# Patient Record
Sex: FEMALE | Race: WHITE
[De-identification: names, ages, dates, MRNs, and addresses within clinical notes are randomized per-mention and may not be internally consistent; named-entity substitution may affect disease eponyms.]

---

## 2019-09-26 ENCOUNTER — HOSPITAL ENCOUNTER (OUTPATIENT)
Dept: HOSPITAL 96 - M.PC | Age: 74
End: 2019-09-26
Attending: ANESTHESIOLOGY
Payer: COMMERCIAL

## 2019-09-26 DIAGNOSIS — I10: ICD-10-CM

## 2019-09-26 DIAGNOSIS — D64.9: ICD-10-CM

## 2019-09-26 DIAGNOSIS — Z79.899: ICD-10-CM

## 2019-09-26 DIAGNOSIS — F32.9: ICD-10-CM

## 2019-09-26 DIAGNOSIS — Z85.048: ICD-10-CM

## 2019-09-26 DIAGNOSIS — I73.9: ICD-10-CM

## 2019-09-26 DIAGNOSIS — J43.9: ICD-10-CM

## 2019-09-26 DIAGNOSIS — E55.9: ICD-10-CM

## 2019-09-26 DIAGNOSIS — M54.16: ICD-10-CM

## 2019-09-26 DIAGNOSIS — M48.56XD: Primary | ICD-10-CM

## 2019-10-03 ENCOUNTER — HOSPITAL ENCOUNTER (OUTPATIENT)
Dept: HOSPITAL 96 - M.PC | Age: 74
Discharge: HOME | End: 2019-10-03
Attending: ANESTHESIOLOGY
Payer: COMMERCIAL

## 2019-10-03 DIAGNOSIS — I73.89: ICD-10-CM

## 2019-10-03 DIAGNOSIS — J43.8: ICD-10-CM

## 2019-10-03 DIAGNOSIS — E55.9: ICD-10-CM

## 2019-10-03 DIAGNOSIS — Z79.899: ICD-10-CM

## 2019-10-03 DIAGNOSIS — I10: ICD-10-CM

## 2019-10-03 DIAGNOSIS — D64.9: ICD-10-CM

## 2019-10-03 DIAGNOSIS — F32.9: ICD-10-CM

## 2019-10-03 DIAGNOSIS — M54.16: Primary | ICD-10-CM

## 2019-10-03 DIAGNOSIS — Z85.048: ICD-10-CM

## 2019-10-23 NOTE — PAINCON
[address]  [address]                    PAIN MANAGEMENT CONSULTATION  
_______________________________________________________________________________
 
Name:       CORBIN MCKEON                 Room:                      REG CLI 
M.R.#:  [id number]      Account #:      [id number]  
Admission:  10/03/19     Attend Phys:    MEGHAN Paulino MD   
Discharge:               Date of Birth:  [birth year]  
         Report #: [id number]
                                                                     [id number]  
_______________________________________________________________________________
THIS REPORT FOR:  //name//                      
 
CC: Cody Paulino
 
DATE OF SERVICE:  10/03/2019
 
 
PRIMARY CARE PHYSICIAN:  Cody Wills MD
 
CHIEF COMPLAINT:  Pain down into the left leg as well as some left shoulder
pain.
 
HISTORY:  The patient is a 73-year-old female who has been seen in the pain
clinic because of lumbar radiculopathy.  She is experiencing pain, which has
been radiating down into her left leg.  Pain also is problematic in the left
scapular area and left shoulder.  She has had lumbar pain for years.  She states
that she recently fell and hurt her rib.  Has not sought medical attention. 
Notes that her pain increases with standing.  She has used CBD oil.  Feels that
provides some benefit.  In the past, she has had epidural steroid injections and
these provided pain relief for a similar pain.  She has returned today with
hopes of undergoing an epidural steroid injection.  As you may recall, she has
foot drop.  She has had this since she was a child.  Does walk with use of a
rolling walker.  She has been told that she has an aortic aneurysm in the
abdominal area, but is not large enough to treat.
 
ALLERGIES:  No known drug allergies.
 
CURRENT MEDICATIONS:  Amlodipine 10 mg, vitamin D3 5000 units weekly, vitamin D3
2000 units daily, Trelegy Ellipta 100/62.5/25, meclizine 25 mg t.i.d.
 
PAIN CLINIC ASSESSMENT AND PQRS:
1.  The patient is not being treated for osteoarthritis or rheumatoid arthritis.
2.  Height 5 feet 2 inches, weight 127 pounds, BMI is 22.
3.  Vitamins.
4.  Blood pressure 130/73, heart rate 80, respiratory rate 18, room air
saturation 95%, temperature 97.9.
5.  Pain intensity score 15/10.
6.  Fall history:  The patient has not fallen since we saw her last.
7.  Blood thinner.  The patient is not on a blood thinning medication.
8.  Hypertension.  The patient is being treated for hypertension.
9.  Opioids greater than 6 weeks.  The patient receives her pain medications
from her primary.  She is not on an opioid regimen.
10.  Risk assessment tool, low for opioid use.
11.  Functional assessment tool.
12.  Recreational drug use.  The patient denies.
 
 
 
[place] [address]                    PAIN MANAGEMENT CONSULTATION  
_______________________________________________________________________________
 
Name:       ALEXKASSIEFARIDACORBIN                 Room:                      REG CLI 
M.R.#:  [id number]      Account #:      [id number]  
Admission:  10/03/19     Attend Phys:    MEGHAN Paulino MD   
Discharge:               Date of Birth:  [birth year]  
         Report #: [id number]
                                                                     [id number]  
_______________________________________________________________________________
13.  Tobacco:  The patient denies.
14.  Alcohol.  The patient denies frequent use of alcoholic beverages.
 
PHYSICAL EXAMINATION:
GENERAL:  The patient is a well-developed, well-nourished white female.  Appears
her stated age.  She is alert and oriented x 3.  Her affect is appropriate. 
Speech is fluent.
HEENT:  Normocephalic, atraumatic.  Extraocular eye muscles are intact.  Sclerae
nonicteric.  Mucous membranes are moist.
NECK:  Without adenopathy.
LUNGS:  Generally clear to auscultation.
HEART:  Regular rate.
ABDOMEN:  Nontender.
MUSCULOSKELETAL:  Upper extremity muscle strength judged to be 5-/5 for the
major muscle groups in the upper extremity.  The patient has some pain and
discomfort in the left shoulder area near the scapula.  Has some discomfort near
the rhomboids.  Lower extremity, the patient has pain and discomfort that is
radiating down in the L5-S1 dermatomal distribution on the left side.  Has a
perception of weakness in her left leg.  The patient has foot drop on the left
side.  The patient has a well-healed scar on the left side of the leg.  The
patient is without significant scoliosis, kyphosis or lordosis.  The patient is
ambulating with a rolling walker.
 
IMPRESSION:
1.  Lumbar radiculopathy with L5-S1 dermatomal distribution.
2.  Aortic aneurysm, stable.
3.  Vitamin D deficiency.
4.  Compression fracture at L2 in the past.
5.  Anemia.
6.  Peripheral vascular disease, left foot drop.
7.  Elevated liver enzymes.
8.  Depression.
9.  Hypertension.
10.  Emphysema.
11.  History of rectal cancer, status post chemotherapy and radiation in 2008.
 
RECOMMENDATIONS:  We discussed treatment options with the patient.  At this
juncture, she is having pain that is radiating down in the left L5-S1 dermatomal
distribution.  She has returned today with the hopes of undergoing an epidural
injection.  Risks and benefits of the procedure were again reviewed.  They
include but are not limited to infection, worsening pain, no improvement in
pain, nerve damage, spinal headache and the patient elects to proceed.
 
PROCEDURE NOTE:  The patient was placed in the prone position.  Fluoroscopy was
used to identify the L5-S1 area.  This area had been sterilely prepped with a
Betadine solution on 3 occasions allowed to dry.  Fluoroscopy using anterior,
 
 
 
[place] [address]                    PAIN MANAGEMENT CONSULTATION  
_______________________________________________________________________________
 
Name:       CORBIN MCKEON                 Room:                      REG CLI 
M.R.#:  [id number]      Account #:      [id number]  
Admission:  10/03/19     Attend Phys:    MEGHAN Paulino MD   
Discharge:               Date of Birth:  [birth year]  
         Report #: [id number]
                                                                     [id number]  
_______________________________________________________________________________
posterior as well as lateral viewing were implemented.  A 25-gauge needle was
then advanced into the L5-S1 area.  An infiltration of the area with 0.25%
bupivacaine was performed.  A 25-gauge needle using loss of resistance
technique, which used to gain access to the epidural space.  Aspiration was
negative.  There was no CSF, heme or paresthesia.  A total of 80 mg Depo-Medrol,
40 mg triamcinolone and 2 mL of 0.25% bupivacaine was injected.  The patient
tolerated the procedure well.  There were no complications.
 
We would like to thank you for letting us participate in her care.  We hope she
continues to improve.
 
 
 
 
 
 
 
 
 
 
 
 
 
 
 
 
 
 
 
 
 
 
 
 
 
 
 
 
 
 
 
 
 
 
<ELECTRONICALLY SIGNED>
                                        By:  MEGHAN Paulino MD            
10/23/19     0909
D: 10/03/19 1339_______________________________________
T: 10/03/19 1514N. Morteza Paulino MD               /[facility]

## 2019-11-14 ENCOUNTER — HOSPITAL ENCOUNTER (OUTPATIENT)
Dept: HOSPITAL 96 - M.PC | Age: 74
Discharge: HOME | End: 2019-11-14
Attending: ANESTHESIOLOGY
Payer: COMMERCIAL

## 2019-11-14 DIAGNOSIS — F32.9: ICD-10-CM

## 2019-11-14 DIAGNOSIS — D64.9: ICD-10-CM

## 2019-11-14 DIAGNOSIS — Z79.899: ICD-10-CM

## 2019-11-14 DIAGNOSIS — Z85.048: ICD-10-CM

## 2019-11-14 DIAGNOSIS — I73.9: ICD-10-CM

## 2019-11-14 DIAGNOSIS — I10: ICD-10-CM

## 2019-11-14 DIAGNOSIS — G89.29: ICD-10-CM

## 2019-11-14 DIAGNOSIS — Z98.890: ICD-10-CM

## 2019-11-14 DIAGNOSIS — J43.9: ICD-10-CM

## 2019-11-14 DIAGNOSIS — M54.16: Primary | ICD-10-CM

## 2019-12-02 NOTE — PAINCON
[address]  [address]                    PAIN MANAGEMENT CONSULTATION  
_______________________________________________________________________________
 
Name:       CORBIN MCKEON                 Room:                      REG CLI 
MSUNDAY#:  [id number]      Account #:      [id number]  
Admission:  11/14/19     Attend Phys:    MEGHAN Paulino MD   
Discharge:               Date of Birth:  [birth year]  
         Report #: [id number]
                                                                     [id number]  
_______________________________________________________________________________
THIS REPORT FOR:  //name//                      
 
CC: Cody Paulino
 
DATE OF SERVICE:  11/14/2019
 
 
CHIEF COMPLAINT:  Left shoulder pain as well as some low back pain.  "I am
having pain that is going down into my leg."
 
HISTORY OF PRESENT ILLNESS:  The patient is a 73-year-old female, who has been
seen in the Pain Clinic in the past because of lumbar radiculopathy.  She
returns today with a number of pain areas.  She has pain in her left shoulder. 
This involves the scapular area.  She also has a second pain in the lower
portion of her back that is radiating down into her posterior thigh.  She has
had some pain and discomfort for a number of years.  The patient received good
relief from the last epidural steroid injection.  She had 100% relief for a
number of weeks.  She returned today indicating that her pain has become more
problematic.  She rates her pain as a 10/10.  She has tried CBD oil with the
hopes that this would improve her pain control.  She had no complication from
the last procedure.  She also has an aortic aneurysm in the abdominal area.  At
this point, it is limited in size and not large enough to treat.
 
ALLERGIES:  No known drug allergies.
 
CURRENT MEDICATIONS:  Amlodipine 10 mg, vitamin D3 5000 units weekly, vitamin D3
2000 units daily, Trelegy Ellipta 100/62.5/25, meclizine 25 mg t.i.d.
 
PAIN CLINIC ASSESSMENT AND PQRS:
1.  The patient is not being treated for osteoarthritis or rheumatoid arthritis.
2.  Height 5 feet 2 inches, weight 124 pounds, BMI is 22.8.
3.  Vital Signs:  Blood pressure 134/77, heart rate 95, respiratory rate 16,
room air saturation 97%.
4.  Pain intensity 10/10.
5.  Fall history:  The patient has not fallen in the last 3 months.
6.  Blood thinner.  The patient is not on a blood thinning medication.
7.  Hypertension.  The patient is being treated for hypertension.
8.  Opioids greater than 6 weeks.  The patient receives medication from her
primary physician.
9.  Risk assessment tool, low for opioid use.
10.  Functional assessment tool.
11.  Recreational drug use:  The patient denies.
12.  Tobacco:  The patient denies.
13.  Alcohol.  The patient denies frequent use of alcoholic beverages.
 
 
 
[place] [address]                    PAIN MANAGEMENT CONSULTATION  
_______________________________________________________________________________
 
Name:       CORBIN MCKEON                 Room:                      REG CLI 
M.R.#:  [id number]      Account #:      [id number]  
Admission:  11/14/19     Attend Phys:    MEGHAN Paulino MD   
Discharge:               Date of Birth:  [birth year]  
         Report #: [id number]
                                                                     [id number]  
_______________________________________________________________________________
 
PHYSICAL EXAMINATION:
GENERAL:  The patient is a well-developed, well-nourished white female; appears
her stated age.  She is alert and oriented x3.  Her affect is appropriate. 
Speech is fluent.
HEENT:  Normocephalic, atraumatic.  Extraocular eye muscles intact.  Sclerae are
anicteric.  Mucous membranes are moist.
NECK:  Without adenopathy.
LUNGS:  Generally clear.
HEART:  Regular.
ABDOMEN:  Nontender.
MUSCULOSKELETAL:  Upper extremity muscle strength judged to be 5-/5 for the
major muscle groups in the upper extremity.  The patient has some discomfort in
the left shoulder.  She has pain in the scapular area and in the left shoulder. 
Lower extremity, the patient has pain and discomfort radiating down the L5-S1
dermatomal distribution involving the left side.  She has perception of weakness
in her left leg.  The patient does have left-sided drop foot.  She has had this
since being a child.  She is without significant scoliosis, kyphosis, or
lordosis.  The patient ambulates using a rolling walker.
 
IMPRESSION:
1.  Lumbar radiculopathy, L5-S1 dermatomal distribution.
2.  Aortic aneurysm, stable.
3.  Vitamin D deficiency.
4.  Compression fracture at L1-L2 in the past.
5.  Anemia.
6.  Peripheral vascular disease.
7.  Left foot drop.
8.  Elevated liver enzymes.
9.  Depression.
10.  Hypertension.
11.  Emphysema.
12.  History of rectal cancer, status post chemotherapy with radiation in 2008.
 
RECOMMENDATIONS:  We discussed treatment options with the patient.  Risks and
benefits of an epidural steroid injection were discussed.  They include but are
not limited to infection, worsening pain, no improvement in pain, and nerve
damage.  The patient elects to proceed.
 
PROCEDURE NOTE:  The patient was taken to the procedure area.  She was then
assisted in getting on the examination table.  Her back was sterilely prepped
with a Betadine solution.  About 0.25% bupivacaine was infiltrated at the L5-S1
area.  Fluoroscopy using anterior and posterior as well as lateral viewing was
implemented.  A 25-gauge needle was then used to anesthetize the L5-S1 area.  A
17-gauge Tuohy was then used to follow up and placed in the area of the epidural
space.  There was no CSF, heme, or paresthesia.  A total of 80 mg Depo-Medrol,
 
 
 
[address] [place]  [address]                    PAIN MANAGEMENT CONSULTATION  
_______________________________________________________________________________
 
Name:       CORBIN MCKEON                 Room:                      REG CLI 
M.R.#:  [id number]      Account #:      [id number]  
Admission:  11/14/19     Attend Phys:    MEGHAN Paulino MD   
Discharge:               Date of Birth:  [birth year]  
         Report #: [id number]
                                                                     [id number]  
_______________________________________________________________________________
40 mg triamcinolone, and 2 mL of 0.25% bupivacaine were injected.  The patient
tolerated the procedure well.  There were no complications.  She remained in the
Pain Clinic for an appropriate amount of time.
 
We would like to thank you for letting us participate in her care.  We hope she
continues to improve.
 
 
 
 
 
 
 
 
 
 
 
 
 
 
 
 
 
 
 
 
 
 
 
 
 
 
 
 
 
 
 
 
 
 
 
 
 
 
<ELECTRONICALLY SIGNED>
                                        By:  MEGHAN Paulino MD            
12/02/19     1934
D: 11/26/19 1239_______________________________________
T: 11/26/19 1308N. Morteza Paulino MD               /nt

## 2019-12-12 ENCOUNTER — HOSPITAL ENCOUNTER (OUTPATIENT)
Dept: HOSPITAL 96 - M.PC | Age: 74
Discharge: HOME | End: 2019-12-12
Attending: ANESTHESIOLOGY
Payer: COMMERCIAL

## 2019-12-12 DIAGNOSIS — J43.9: ICD-10-CM

## 2019-12-12 DIAGNOSIS — F32.9: ICD-10-CM

## 2019-12-12 DIAGNOSIS — I73.9: ICD-10-CM

## 2019-12-12 DIAGNOSIS — I10: ICD-10-CM

## 2019-12-12 DIAGNOSIS — D64.9: ICD-10-CM

## 2019-12-12 DIAGNOSIS — M54.16: Primary | ICD-10-CM

## 2019-12-12 DIAGNOSIS — Z85.048: ICD-10-CM

## 2019-12-12 DIAGNOSIS — Z98.890: ICD-10-CM

## 2019-12-12 DIAGNOSIS — G89.29: ICD-10-CM

## 2019-12-12 DIAGNOSIS — Z79.899: ICD-10-CM

## 2019-12-18 NOTE — H
[address]  [address]                    HISTORY AND PHYSICAL          
_______________________________________________________________________________
 
Name:       CORBIN MCKEON                 Room:                      REG CLI 
M.RCampos#:  [id number]      Account #:      [id number]  
Admission:  12/12/19     Attend Phys:    MEGHAN Paulino MD   
Discharge:               Date of Birth:  [birth year]  
         Report #: [id number]
                                                                     [id number]  
_______________________________________________________________________________
THIS REPORT FOR:  //name//                      
 
CC: Cody Paulino
 
DATE OF SERVICE:  12/12/2019
 
 
CHIEF COMPLAINT:  Low back pain with pain in the left buttocks and down into the
lower portion of the back.
 
HISTORY:  The patient is a 74-year-old female who has been seen in the pain
clinic in the past because of lumbar radiculopathy.  She has returned today
indicating that her pain continues to be problematic.  She is having pain that
is radiating down the lower portion of her back into the buttocks area and down
into her leg.  She gleaned significant benefit from the injections.  Last one
was not as fruitful as the first.  At this juncture, she continues to have pain,
which is quite debilitating.  She rates it as a 10/10 today.  Notes that the
pain is worse with walking, standing, climbing stairs, sitting, lifting and
bending.  Describes the pain as constant.  She has used CBD oil to help control
the pain.  Overall, her pain is quite significant.  She would like to proceed
with another injection.  Denies any new bowel or bladder dysfunction.  As you
may recall, she has an aortic aneurysm, which is being monitored.
 
ALLERGIES:  No known drug allergies.
 
CURRENT MEDICATIONS:  Amlodipine 10 mg, vitamin D 5000 units weekly, vitamin D3,
2000 units daily, Trelegy Ellipta 100/62.5/25, meclizine 25 mg t.i.d.
 
PAIN CLINIC ASSESSMENT/PQRS:
1.  The patient has pain and discomfort and some osteoarthritic changes.  This
involves her low back as well as her left leg.  She is not being treated for
rheumatoid arthritis.  Does ambulate with use of a walker.
2.  Height 5 feet 2 inches, weight 127 pounds, BMI is 23.3.
3.  Vital Signs:  Blood pressure 133/76, heart rate 89, respiratory rate 20,
room air saturation 95%, temperature 98.7.
4.  Pain intensity 10/10.
5.  Fall history:  The patient has not fallen since we saw her last.
6.  Blood thinner.  The patient is not on a blood thinning medication.
7.  Hypertension.  The patient is being treated for hypertension.
8.  Opioids greater than 6 weeks.  The patient receives medications from her
primary physician.  She is not taking opioids.  She is using CBD oil.
9.  Risk assessment tool, low for opioid use.
10.  Functional assessment tool.
11.  Recreational drug use:  The patient denies.
12.  Tobacco:  The patient denies.
 
 
 
[place] [address]                    HISTORY AND PHYSICAL          
_______________________________________________________________________________
 
Name:       CORBIN MCKEON                 Room:                      REG CLI 
M.R.#:  [id number]      Account #:      [id number]  
Admission:  12/12/19     Attend Phys:    MEGHAN Paulino MD   
Discharge:               Date of Birth:  [birth year]  
         Report #: [id number]
                                                                     [id number]  
_______________________________________________________________________________
13.  Alcohol:  The patient denies frequent use of alcoholic beverages.
 
PHYSICAL EXAMINATION:
GENERAL:  The patient is a well-developed, well-nourished white female.  Appears
her stated age.  She is alert and oriented x 3.  Her affect is appropriate. 
Speech is fluent.
HEENT:  Normocephalic, atraumatic.  Extraocular eye muscles intact.  Sclerae
nonicteric.  Mucous membranes are moist.
NECK:  Without adenopathy or JVD.
LUNGS:  Generally clear.
HEART:  Regular rate.
ABDOMEN:  Nontender.  Bowel sounds present.
MUSCULOSKELETAL:  Upper extremity muscle strength judged to be 5-/5 for the
major muscle groups in the upper extremity.  The patient has some pain and
discomfort in the left shoulder.  She has some pain in the lower extremity. 
There is weakness associated with her left lower leg.  She has had lower
extremity weakness with a foot drop since been a child.  Pain and discomfort
does deal in compressing the L5-S1 dermatomal distribution.  She is without
significant scoliosis, kyphosis, or lordosis.
 
IMPRESSION:
1.  Lumbar radiculopathy with L5-S1 dermatomal distribution.
2.  Aortic aneurysm, stable.
3.  Vitamin D deficiency.
4.  Compression fracture L1-L2 in the past.
5.  Anemia.
6.  Peripheral vascular disease.
7.  Left foot drop.
8.  Elevated liver enzymes.
9.  Depression.
10.  Hypertension.
11.  Emphysema.
12.  History of rectal cancer, status post chemotherapy and radiation in 2008.
 
RECOMMENDATIONS:  We discussed treatment options with the patient.  Risks and
benefits of an epidural steroid injection were discussed.  Possible
complications of the procedure were reviewed.  They could include, but are not
limited to infection, worsening of pain, no improvement in pain, nerve damage
and the patient elects to proceed.
 
PROCEDURE NOTE:  The patient was taken to the procedure area.  She was then
assisted in getting on the examination table.  Her back was sterilely prepped
with a Betadine solution.  A 0.25% bupivacaine was infiltrated at the L5-S1
area.  This area was numbed with a 17-gauge Tuohy at the L5-S1 area.  There
was no CSF, heme or paresthesia.  A total of 80 mg Depo-Medrol, 40 mg
triamcinolone and 2 mL of 0.25% bupivacaine was injected.  The patient tolerated
 
 
 
[facility] 
[address] [place] [address]                    HISTORY AND PHYSICAL          
_______________________________________________________________________________
 
Name:       CORBIN MCKEON                 Room:                      REG CLI 
M.R.#:  [id number]      Account #:      [id number]  
Admission:  12/12/19     Attend Phys:    MEGHAN Paulino MD   
Discharge:               Date of Birth:  [birth year]  
         Report #: [id number]
                                                                     [id number]  
_______________________________________________________________________________
the procedure well.  She remained in the Pain Clinic for an appropriate amount
of time.  She will follow up in the future as needed.
 
We would like to thank you for letting us participate in her care.  We hope she
continues to improve.
 
 
 
 
 
 
 
 
 
 
 
 
 
 
 
 
 
 
 
 
 
 
 
 
 
 
 
 
 
 
 
 
 
 
 
 
 
 
 
<ELECTRONICALLY SIGNED>
                                        By:  MEGHAN Paulino MD            
12/18/19     1330
D: 12/12/19 1432_______________________________________
T: 12/12/19 1448N. Morteza Paulino MD               /[facility]